# Patient Record
Sex: MALE | Race: WHITE | Employment: FULL TIME | URBAN - METROPOLITAN AREA
[De-identification: names, ages, dates, MRNs, and addresses within clinical notes are randomized per-mention and may not be internally consistent; named-entity substitution may affect disease eponyms.]

---

## 2019-09-05 ENCOUNTER — OFFICE VISIT (OUTPATIENT)
Dept: INTERNAL MEDICINE CLINIC | Facility: CLINIC | Age: 39
End: 2019-09-05
Payer: COMMERCIAL

## 2019-09-05 VITALS
BODY MASS INDEX: 25.83 KG/M2 | WEIGHT: 155 LBS | HEIGHT: 65 IN | DIASTOLIC BLOOD PRESSURE: 74 MMHG | TEMPERATURE: 98 F | SYSTOLIC BLOOD PRESSURE: 131 MMHG | HEART RATE: 78 BPM | RESPIRATION RATE: 18 BRPM

## 2019-09-05 DIAGNOSIS — Z02.1 PHYSICAL EXAM, PRE-EMPLOYMENT: Primary | ICD-10-CM

## 2019-09-05 LAB
INDURATION (): 0 MM (ref 0–11)
RUBV IGG SER QL: POSITIVE
RUBV IGG SER-ACNC: 154.1 IU/ML (ref 10–?)

## 2019-09-05 PROCEDURE — 86580 TB INTRADERMAL TEST: CPT | Performed by: INTERNAL MEDICINE

## 2019-09-05 PROCEDURE — 99385 PREV VISIT NEW AGE 18-39: CPT | Performed by: INTERNAL MEDICINE

## 2019-09-05 NOTE — PROGRESS NOTES
Pt received TB test on 9/5/19   Pt will return on sat at the [place] office   For reading, pt was given information.    Pt understand

## 2019-09-05 NOTE — PROGRESS NOTES
HPI:    Patient ID: Mildred Mcintosh is a 46year old male. HPI He is here for physical prior to employment . He needs tb test and vaccine titer .  No complains today       Review of Systems   Constitutional: Negative for activity change, appetite change, PHYSICAL EXAM:    Physical Exam   Constitutional: He is oriented to person, place, and time. He appears well-developed and well-nourished. No distress. HENT:   Head: Normocephalic and atraumatic.    Right Ear: Tympanic membrane and external ear normal He is alert and oriented to person, place, and time. He has normal reflexes. No cranial nerve deficit. He exhibits normal muscle tone. Coordination normal.   Skin: Skin is warm, dry and intact. No rash noted. No cyanosis or erythema.  Nails show no clubbing

## 2019-09-06 LAB
MEV IGG SER-ACNC: 42.2 AU/ML (ref 30–?)
MUV IGG SER IA-ACNC: 154 AU/ML (ref 11–?)
VZV IGG SER IA-ACNC: 391.7 (ref 165–?)

## 2019-09-07 ENCOUNTER — TELEPHONE (OUTPATIENT)
Dept: INTERNAL MEDICINE CLINIC | Facility: CLINIC | Age: 39
End: 2019-09-07

## 2019-09-07 ENCOUNTER — NURSE ONLY (OUTPATIENT)
Dept: INTERNAL MEDICINE CLINIC | Facility: CLINIC | Age: 39
End: 2019-09-07
Payer: COMMERCIAL

## 2019-09-07 DIAGNOSIS — Z11.1 ENCOUNTER FOR PPD SKIN TEST READING: Primary | ICD-10-CM

## 2019-09-07 NOTE — TELEPHONE ENCOUNTER
Pt stt if his physical can be fax over to [facility] [address]         fax [address] [facility]

## 2019-09-16 NOTE — TELEPHONE ENCOUNTER
Patient calling and requesting to fax again the physical form at [phone]: [address] [facility] [address]     States that the fax that he gave before was the school fax BUT the district is the one requesting for the form, works as a . Office staff=please re fax again to the new fax number provided above.